# Patient Record
Sex: MALE | Race: WHITE | NOT HISPANIC OR LATINO | Employment: UNEMPLOYED | URBAN - NONMETROPOLITAN AREA
[De-identification: names, ages, dates, MRNs, and addresses within clinical notes are randomized per-mention and may not be internally consistent; named-entity substitution may affect disease eponyms.]

---

## 2024-10-14 ENCOUNTER — HOSPITAL ENCOUNTER (EMERGENCY)
Facility: HOSPITAL | Age: 44
Discharge: HOME | End: 2024-10-14
Payer: COMMERCIAL

## 2024-10-14 ENCOUNTER — TELEPHONE (OUTPATIENT)
Age: 44
End: 2024-10-14

## 2024-10-14 VITALS
DIASTOLIC BLOOD PRESSURE: 84 MMHG | OXYGEN SATURATION: 99 % | RESPIRATION RATE: 18 BRPM | TEMPERATURE: 97.8 F | SYSTOLIC BLOOD PRESSURE: 132 MMHG | HEART RATE: 90 BPM | WEIGHT: 145.72 LBS

## 2024-10-14 DIAGNOSIS — F41.9 ANXIETY: Primary | ICD-10-CM

## 2024-10-14 LAB
ATRIAL RATE: 92 BPM
P AXIS: 76 DEGREES
PR INTERVAL: 156 MS
QRS AXIS: 70 DEGREES
QRSD INTERVAL: 88 MS
QT INTERVAL: 360 MS
QTC INTERVAL: 445 MS
T WAVE AXIS: 64 DEGREES
VENTRICULAR RATE: 92 BPM

## 2024-10-14 PROCEDURE — 99285 EMERGENCY DEPT VISIT HI MDM: CPT

## 2024-10-14 PROCEDURE — 99284 EMERGENCY DEPT VISIT MOD MDM: CPT

## 2024-10-14 PROCEDURE — 93005 ELECTROCARDIOGRAM TRACING: CPT

## 2024-10-14 PROCEDURE — 93010 ELECTROCARDIOGRAM REPORT: CPT | Performed by: INTERNAL MEDICINE

## 2024-10-14 RX ORDER — LORAZEPAM 1 MG/1
1 TABLET ORAL ONCE
Status: COMPLETED | OUTPATIENT
Start: 2024-10-14 | End: 2024-10-14

## 2024-10-14 RX ORDER — LORAZEPAM 1 MG/1
1 TABLET ORAL 2 TIMES DAILY PRN
Qty: 10 TABLET | Refills: 0 | Status: SHIPPED | OUTPATIENT
Start: 2024-10-14 | End: 2024-10-24

## 2024-10-14 RX ADMIN — LORAZEPAM 1 MG: 1 TABLET ORAL at 03:13

## 2024-10-14 NOTE — TELEPHONE ENCOUNTER
cted the patient regarding a routine referral to verify service needs and inform pt of the current waitlist. Message left for patient to call back for assistance with being placed on the proper wait list.     Writer checked [facility] for pts insurance: [facility]

## 2024-10-14 NOTE — ED PROVIDER NOTES
Time reflects when diagnosis was documented in both MDM as applicable and the Disposition within this note       Time User Action Codes Description Comment    10/14/2024  3:34 AM Billy Glass Add [F41.9] Anxiety           ED Disposition       ED Disposition   Discharge    Condition   Stable    Date/Time   Mon Oct 14, 2024  3:34 AM    Comment   Nic Estrada discharge to home/self care.                   Assessment & Plan       Medical Decision Making  Medical complexity: 44-year-old male presenting with symptoms of anxiety and heart racing.  I suspect that his heart racing is likely secondary to his anxiety but will screen EKG for acute ischemic change or signs of arrhythmia or arrhythmia prone abnormality.  Will offer the patient pharmacologic therapy for his anxiety (likely benzodiazepine in the setting of opioid withdrawal), and also counseled the patient on the likelihood of ongoing symptoms in the setting of withdrawing from Suboxone.  On physical examination, no red flags, and essentially normal range vital signs except for very mild hypertension.  Will continue to monitor on cardiac monitoring while patient here in the ED.  I offered the patient crisis evaluation however he declining at this time as he does not feel he is a danger to himself or others.  Will refer to psychiatry, and likely discharge after trial dose of Ativan in the ED.    Reassessment/disposition: Thankfully, patient's symptoms were improving after dose of anxiolytic here in the emergency department.  His ECG was within normal limits.  Patient is having no further symptoms at this time.  Therefore will be discharged with psychiatry referral, PCP follow-up, and instructed to come back to the ED if he has any SI, HI, or severe depression.  Patient expressed understanding of these return precautions and agrees with the follow-up plan as above.    Risk  Prescription drug management.        ED Course as of 10/14/24 0421   Mon Oct 14, 2024    0313 ECG interpreted by me.  Date: 10/14/2024.  Time: 0255.  Rate: 92 bpm.  Axis: Normal.  Rhythm: Regular.  This is sinus rhythm with P waves preceding every QRS.  There are no ST elevations or depressions evident.  This is a normal ECG.       Medications   LORazepam (ATIVAN) tablet 1 mg (1 mg Oral Given 10/14/24 0313)       ED Risk Strat Scores                           SBIRT 20yo+      Flowsheet Row Most Recent Value   Initial Alcohol Screen: US AUDIT-C     1. How often do you have a drink containing alcohol? 0 Filed at: 10/14/2024 0253   2. How many drinks containing alcohol do you have on a typical day you are drinking?  0 Filed at: 10/14/2024 0253   3a. Male UNDER 65: How often do you have five or more drinks on one occasion? 0 Filed at: 10/14/2024 0253   3b. FEMALE Any Age, or MALE 65+: How often do you have 4 or more drinks on one occassion? 0 Filed at: 10/14/2024 0253   Audit-C Score 0 Filed at: 10/14/2024 0253   ROSALIE: How many times in the past year have you...    Used an illegal drug or used a prescription medication for non-medical reasons? Never Filed at: 10/14/2024 0253                            History of Present Illness       Chief Complaint   Patient presents with    Anxiety     Pt states that he has been having increased anxiety these last 2 days and has been having palpitations due to it.        Past Medical History:   Diagnosis Date    Anxiety     Cirrhosis (HCC)       History reviewed. No pertinent surgical history.   History reviewed. No pertinent family history.   Social History     Tobacco Use    Smoking status: Never    Smokeless tobacco: Never   Vaping Use    Vaping status: Every Day   Substance Use Topics    Alcohol use: Never    Drug use: Never      E-Cigarette/Vaping    E-Cigarette Use Current Every Day User     Cartridges/Day .5 a day       E-Cigarette/Vaping Substances      I have reviewed and agree with the history as documented.     This is a 44-year-old male who is recovering  "from opioid addiction (had been on Suboxone until last week and is trying to wean himself), who is presenting today with anxiety, and feelings of heart racing.  He states that he just moved from [place] a few days ago.  Since moving, he states that when he lies down at night and tries to go to sleep he begins to feel very anxious and feels his heart racing.  He notes a feeling of impending doom.  He states this is causing him significant distress.  He states that during the day as his symptoms are better, however his symptoms worsened again at night.  He is currently lives with his mother in [place] and is planning to stay here for the foreseeable future.  When he was in [place] he was on Suboxone but now wishes to be off of all medications if possible.  He does take \"Kratum\" to manage his withdrawal symptoms for the past several days.  I counseled the patient on the dangers of kratom and how it may be making his anxiety worse.  Patient does report that he has a history of anxiety in the past.  He denies ever needing psychiatric hospitalization for his anxiety.  He notes that he does have some baseline depression, but denies any SI, HI, AH or VH.  His mother corroborates his story and states that she is mostly concerned about his anxiety, and does not feel that he is a danger to himself or others.        Review of Systems   Constitutional:  Negative for chills, fatigue and fever.   HENT:  Negative for congestion and sore throat.    Eyes:  Negative for pain and visual disturbance.   Respiratory:  Negative for cough, chest tightness and shortness of breath.    Cardiovascular:  Positive for palpitations. Negative for chest pain.   Gastrointestinal:  Negative for abdominal pain, blood in stool, constipation, diarrhea, nausea and vomiting.   Genitourinary:  Negative for dysuria, flank pain and hematuria.   Musculoskeletal:  Negative for arthralgias, back pain and neck pain.   Skin:  Negative for color change " and rash.   Neurological:  Negative for dizziness, syncope and light-headedness.   Hematological:  Negative for adenopathy. Does not bruise/bleed easily.   Psychiatric/Behavioral:  Positive for agitation and dysphoric mood. The patient is nervous/anxious.    All other systems reviewed and are negative.          Objective       ED Triage Vitals [10/14/24 0254]   Temperature Pulse Blood Pressure Respirations SpO2 Patient Position - Orthostatic VS   97.8 °F (36.6 °C) 90 132/84 18 99 % Lying      Temp Source Heart Rate Source BP Location FiO2 (%) Pain Score    Temporal Monitor Left arm -- No Pain      Vitals      Date and Time Temp Pulse SpO2 Resp BP Pain Score FACES Pain Rating User   10/14/24 0254 97.8 °F (36.6 °C) 90 99 % 18 132/84 No Pain -- AB            Physical Exam  Vitals and nursing note reviewed.   Constitutional:       General: He is not in acute distress.     Appearance: He is well-developed and normal weight. He is not toxic-appearing or diaphoretic.   HENT:      Head: Normocephalic and atraumatic.      Right Ear: External ear normal.      Left Ear: External ear normal.      Nose: Nose normal. No congestion or rhinorrhea.      Mouth/Throat:      Mouth: Mucous membranes are moist.      Pharynx: No oropharyngeal exudate or posterior oropharyngeal erythema.   Eyes:      General: No scleral icterus.     Extraocular Movements: Extraocular movements intact.      Conjunctiva/sclera: Conjunctivae normal.      Pupils: Pupils are equal, round, and reactive to light.   Cardiovascular:      Rate and Rhythm: Normal rate and regular rhythm.      Pulses: Normal pulses.      Heart sounds: No murmur heard.  Pulmonary:      Effort: Pulmonary effort is normal. No respiratory distress.      Breath sounds: Normal breath sounds. No wheezing or rales.   Abdominal:      Palpations: Abdomen is soft. There is no mass.      Tenderness: There is no abdominal tenderness. There is no right CVA tenderness, left CVA tenderness or  guarding.      Hernia: No hernia is present.   Musculoskeletal:         General: No swelling. Normal range of motion.      Cervical back: Normal range of motion and neck supple.      Right lower leg: No edema.      Left lower leg: No edema.   Skin:     General: Skin is warm and dry.      Capillary Refill: Capillary refill takes less than 2 seconds.   Neurological:      General: No focal deficit present.      Mental Status: He is alert and oriented to person, place, and time.   Psychiatric:         Attention and Perception: He is attentive. He does not perceive visual hallucinations.         Mood and Affect: Mood is anxious.         Speech: Speech is not rapid and pressured or delayed.         Behavior: Behavior is hyperactive. Behavior is not agitated, slowed, aggressive, withdrawn or combative.         Thought Content: Thought content does not include homicidal or suicidal ideation.         Cognition and Memory: Cognition is not impaired. Memory is not impaired.         Judgment: Judgment normal.         Results Reviewed       None            No orders to display       Procedures    ED Medication and Procedure Management   None     Discharge Medication List as of 10/14/2024  3:38 AM        START taking these medications    Details   LORazepam (Ativan) 1 mg tablet Take 1 tablet (1 mg total) by mouth 2 (two) times a day as needed for anxiety for up to 10 days, Starting Mon 10/14/2024, Until Thu 10/24/2024 at 2359, Normal             ED SEPSIS DOCUMENTATION   Time reflects when diagnosis was documented in both MDM as applicable and the Disposition within this note       Time User Action Codes Description Comment    10/14/2024  3:34 AM Billy Glass Add [F41.9] Anxiety                  Billy Glass MD  10/14/24 0424

## 2024-10-14 NOTE — DISCHARGE INSTRUCTIONS
Your Ativan should only be taken as needed.  Try not to take unless you really needed to sleep at night.  It is habit-forming and can be addictive.  You need to set up an appointment with a primary doctor now that you have insurance, and psychiatrist.  You will find that not very many people will write prescription for anxiety meds in the Emergency Dept so if you need ongoing pharmacotherapy you will need to go through a primary doctor.    If you do not feel safe with yourself, if you are having thoughts of hurting yourself or others, or if you are having chest pain or shortness of breath come back to the emergency department.

## 2024-10-15 ENCOUNTER — TELEPHONE (OUTPATIENT)
Age: 44
End: 2024-10-15

## 2024-11-04 ENCOUNTER — OFFICE VISIT (OUTPATIENT)
Dept: FAMILY MEDICINE CLINIC | Facility: CLINIC | Age: 44
End: 2024-11-04
Payer: COMMERCIAL

## 2024-11-04 VITALS
HEIGHT: 68 IN | BODY MASS INDEX: 21.92 KG/M2 | SYSTOLIC BLOOD PRESSURE: 122 MMHG | DIASTOLIC BLOOD PRESSURE: 82 MMHG | OXYGEN SATURATION: 99 % | WEIGHT: 144.6 LBS | HEART RATE: 66 BPM | TEMPERATURE: 98.4 F

## 2024-11-04 DIAGNOSIS — F10.11 HISTORY OF ALCOHOL ABUSE: ICD-10-CM

## 2024-11-04 DIAGNOSIS — Z87.19 HISTORY OF PANCREATITIS: ICD-10-CM

## 2024-11-04 DIAGNOSIS — Z87.19 HISTORY OF CIRRHOSIS OF LIVER: ICD-10-CM

## 2024-11-04 DIAGNOSIS — Z86.19 HISTORY OF HEPATITIS C: ICD-10-CM

## 2024-11-04 DIAGNOSIS — F11.90 METHADONE USE: ICD-10-CM

## 2024-11-04 DIAGNOSIS — F11.11 HISTORY OF OPIOID ABUSE (HCC): ICD-10-CM

## 2024-11-04 DIAGNOSIS — F41.9 ANXIETY: Primary | ICD-10-CM

## 2024-11-04 DIAGNOSIS — Z72.0 TOBACCO USE: ICD-10-CM

## 2024-11-04 DIAGNOSIS — R03.0 ELEVATED BLOOD PRESSURE READING: ICD-10-CM

## 2024-11-04 PROCEDURE — 99203 OFFICE O/P NEW LOW 30 MIN: CPT | Performed by: FAMILY MEDICINE

## 2024-11-04 RX ORDER — METHADONE HYDROCHLORIDE 10 MG/5ML
35 SOLUTION ORAL DAILY
COMMUNITY

## 2024-11-04 RX ORDER — BUSPIRONE HYDROCHLORIDE 7.5 MG/1
7.5 TABLET ORAL 2 TIMES DAILY
Qty: 60 TABLET | Refills: 2 | Status: SHIPPED | OUTPATIENT
Start: 2024-11-04

## 2024-11-04 NOTE — PROGRESS NOTES
Ambulatory Visit  Name: Nic Estrada      : 1980      MRN: [id number]  Encounter Provider: Benton Tate DO  Encounter Date: 2024   Encounter department: [facility]    Assessment & Plan  Anxiety  Patient was seen in the emergency room and given lorazepam x 1 dose.  I did discuss with the patient that lorazepam is not indicated.  Patient has had a good response to BuSpar in the past.  Patient has been unable to tolerate SNRIs or SSRIs.  Orders:    Ambulatory Referral to Family Practice    busPIRone (BUSPAR) 7.5 mg tablet; Take 1 tablet (7.5 mg total) by mouth 2 (two) times a day    History of hepatitis C  Patient states he has had a history of hep C since the age of 27.  It is never been treated.  The patient states that when he was hospitalized with acute pancreatitis in the past they told him that he also has cirrhosis.  Will obtain lab work and a baseline ultrasound.  Will also refer to gastroenterology.  Orders:    US right upper quadrant with liver dopplers; Future    CBC and differential; Future    HIV 1/2 AB/AG w Reflex [facility] for 2 yr old and above; Future    Hepatitis panel, acute; Future    Hepatitis C RNA, Quantitative PCR; Future    Protime-INR; Future    Ambulatory Referral to Gastroenterology; Future    History of cirrhosis of liver  Obtain lab work and a baseline ultrasound of the liver.  Refer to gastroenterology.  Orders:    US right upper quadrant with liver dopplers; Future    HIV 1/2 AB/AG w Reflex [facility] for 2 yr old and above; Future    Hepatitis panel, acute; Future    Hepatitis C RNA, Quantitative PCR; Future    Protime-INR; Future    Ambulatory Referral to Gastroenterology; Future    Tobacco use  Discussed tobacco cessation.  Patient is not ready at this time.  Readdress at follow-up visits.       History of pancreatitis  No recurrence of pancreatitis.  Patient states that he was hospitalized for acute pancreatitis when he was younger and was abusing alcohol.        History of opioid abuse (HCC)  Patient has a past history of IV drug abuse when he was younger.  Recently he came from [place] about 2 months ago.  He was abusing Suboxone.  He has recently been seeing a methadone clinic with Dr. Dill in [place].  Orders:    CBC and differential; Future    TSH, 3rd generation with Free T4 reflex; Future    Elevated blood pressure reading  Repeat blood pressure satisfactory.  Continue to monitor.  Orders:    CBC and differential; Future    Comprehensive metabolic panel; Future    TSH, 3rd generation with Free T4 reflex; Future    Methadone use  Patient sees methadone clinic daily in [place].  The physician is Dr. Delphine Dill       History of alcohol abuse  Patient states that he has not used alcohol for a long period of time.  This was mostly when he was younger and was binge drinking.  This resulted in pancreatitis.  Orders:    US right upper quadrant with liver dopplers; Future    Ambulatory Referral to Gastroenterology; Future       History of Present Illness     Patient is a pleasant 44-year-old male who presents this afternoon to establish care at [facility].  The patient recently moved from [place] about 2 months ago to this area.  The patient tells me he grew up in this area as a child.  The patient relates a history of hepatitis C that was diagnosed at the age of 27 after sharing needles while abusing drugs.  Patient did not seek treatment.  Patient also states that when he was younger he abused alcohol and was admitted for acute pancreatitis.  The patient was recently abusing Suboxone but entered a methadone clinic in [place].  The physician there is Dr. Dill.    Anxiety  Symptoms include nervous/anxious behavior. Patient reports no chest pain, palpitations or shortness of breath.             Review of Systems   Constitutional:  Negative for chills and fever.   HENT:  Negative for ear pain and sore throat.    Eyes:  Negative for pain and  "visual disturbance.   Respiratory:  Negative for cough and shortness of breath.    Cardiovascular:  Negative for chest pain and palpitations.   Gastrointestinal:  Negative for abdominal pain and vomiting.   Genitourinary:  Negative for dysuria and hematuria.   Musculoskeletal:  Negative for arthralgias and back pain.   Skin:  Negative for color change and rash.   Neurological:  Negative for seizures and syncope.   Psychiatric/Behavioral:  The patient is nervous/anxious.    All other systems reviewed and are negative.          Objective     /92   Pulse 66   Temp 98.4 °F (36.9 °C)   Ht 5' 7.75\" (1.721 m)   Wt 65.6 kg (144 lb 9.6 oz)   SpO2 99%   BMI 22.15 kg/m²     Physical Exam  Vitals and nursing note reviewed.   Constitutional:       General: He is not in acute distress.     Appearance: Normal appearance. He is well-developed.   HENT:      Head: Normocephalic and atraumatic.      Right Ear: Tympanic membrane normal.      Left Ear: Tympanic membrane normal.      Mouth/Throat:      Mouth: Mucous membranes are moist.      Pharynx: Oropharynx is clear.   Eyes:      Extraocular Movements: Extraocular movements intact.      Conjunctiva/sclera: Conjunctivae normal.      Pupils: Pupils are equal, round, and reactive to light.   Cardiovascular:      Rate and Rhythm: Normal rate and regular rhythm.      Heart sounds: Normal heart sounds. No murmur heard.     No friction rub.   Pulmonary:      Effort: Pulmonary effort is normal. No respiratory distress.      Breath sounds: Normal breath sounds.   Abdominal:      General: Bowel sounds are normal.      Palpations: Abdomen is soft.      Tenderness: There is no abdominal tenderness. There is no guarding or rebound.      Hernia: No hernia is present.   Musculoskeletal:         General: No swelling.      Cervical back: Neck supple.   Skin:     General: Skin is warm and dry.      Capillary Refill: Capillary refill takes less than 2 seconds.   Neurological:      General: " No focal deficit present.      Mental Status: He is alert and oriented to person, place, and time.      Gait: Gait normal.   Psychiatric:         Mood and Affect: Mood normal.         Behavior: Behavior normal.         Thought Content: Thought content normal.       Administrative Statements   I have spent a total time of 30 minutes in caring for this patient on the day of the visit/encounter including Counseling / Coordination of care, Documenting in the medical record, Reviewing / ordering tests, medicine, procedures  , and Obtaining or reviewing history  .

## 2024-11-04 NOTE — ASSESSMENT & PLAN NOTE
Discussed tobacco cessation.  Patient is not ready at this time.  Readdress at follow-up visits.

## 2024-11-04 NOTE — ASSESSMENT & PLAN NOTE
Obtain lab work and a baseline ultrasound of the liver.  Refer to gastroenterology.  Orders:    US right upper quadrant with liver dopplers; Future    HIV 1/2 AB/AG w Reflex [facility] for 2 yr old and above; Future    Hepatitis panel, acute; Future    Hepatitis C RNA, Quantitative PCR; Future    Protime-INR; Future    Ambulatory Referral to Gastroenterology; Future

## 2024-11-04 NOTE — ASSESSMENT & PLAN NOTE
Repeat blood pressure satisfactory.  Continue to monitor.  Orders:    CBC and differential; Future    Comprehensive metabolic panel; Future    TSH, 3rd generation with Free T4 reflex; Future

## 2024-11-04 NOTE — ASSESSMENT & PLAN NOTE
No recurrence of pancreatitis.  Patient states that he was hospitalized for acute pancreatitis when he was younger and was abusing alcohol.

## 2024-11-04 NOTE — ASSESSMENT & PLAN NOTE
Patient states he has had a history of hep C since the age of 27.  It is never been treated.  The patient states that when he was hospitalized with acute pancreatitis in the past they told him that he also has cirrhosis.  Will obtain lab work and a baseline ultrasound.  Will also refer to gastroenterology.  Orders:    US right upper quadrant with liver dopplers; Future    CBC and differential; Future    HIV 1/2 AB/AG w Reflex [facility] for 2 yr old and above; Future    Hepatitis panel, acute; Future    Hepatitis C RNA, Quantitative PCR; Future    Protime-INR; Future    Ambulatory Referral to Gastroenterology; Future

## 2024-11-04 NOTE — ASSESSMENT & PLAN NOTE
Patient was seen in the emergency room and given lorazepam x 1 dose.  I did discuss with the patient that lorazepam is not indicated.  Patient has had a good response to BuSpar in the past.  Patient has been unable to tolerate SNRIs or SSRIs.  Orders:    Ambulatory Referral to Family Practice    busPIRone (BUSPAR) 7.5 mg tablet; Take 1 tablet (7.5 mg total) by mouth 2 (two) times a day

## 2025-01-07 ENCOUNTER — TELEPHONE (OUTPATIENT)
Age: 45
End: 2025-01-07

## 2025-01-07 NOTE — TELEPHONE ENCOUNTER
Contacted patient off of Medication Management  to verify needs of services in attempts to offer patient an appointment. spoke with patient whom stated they are no longer interested In services.

## 2025-01-12 ENCOUNTER — APPOINTMENT (OUTPATIENT)
Dept: LAB | Facility: CLINIC | Age: 45
End: 2025-01-12
Payer: COMMERCIAL

## 2025-01-12 DIAGNOSIS — Z86.19 HISTORY OF HEPATITIS C: ICD-10-CM

## 2025-01-12 DIAGNOSIS — F11.11 HISTORY OF OPIOID ABUSE (HCC): ICD-10-CM

## 2025-01-12 DIAGNOSIS — R03.0 ELEVATED BLOOD PRESSURE READING: ICD-10-CM

## 2025-01-12 DIAGNOSIS — Z87.19 HISTORY OF CIRRHOSIS OF LIVER: ICD-10-CM

## 2025-01-12 LAB
ALBUMIN SERPL BCG-MCNC: 3.9 G/DL (ref 3.5–5)
ALP SERPL-CCNC: 47 U/L (ref 34–104)
ALT SERPL W P-5'-P-CCNC: 37 U/L (ref 7–52)
ANION GAP SERPL CALCULATED.3IONS-SCNC: 5 MMOL/L (ref 4–13)
AST SERPL W P-5'-P-CCNC: 29 U/L (ref 13–39)
BASOPHILS # BLD AUTO: 0.06 THOUSANDS/ΜL (ref 0–0.1)
BASOPHILS NFR BLD AUTO: 1 % (ref 0–1)
BILIRUB SERPL-MCNC: 0.42 MG/DL (ref 0.2–1)
BUN SERPL-MCNC: 23 MG/DL (ref 5–25)
CALCIUM SERPL-MCNC: 9 MG/DL (ref 8.4–10.2)
CHLORIDE SERPL-SCNC: 103 MMOL/L (ref 96–108)
CO2 SERPL-SCNC: 26 MMOL/L (ref 21–32)
CREAT SERPL-MCNC: 1.13 MG/DL (ref 0.6–1.3)
EOSINOPHIL # BLD AUTO: 0.53 THOUSAND/ΜL (ref 0–0.61)
EOSINOPHIL NFR BLD AUTO: 7 % (ref 0–6)
ERYTHROCYTE [DISTWIDTH] IN BLOOD BY AUTOMATED COUNT: 11.9 % (ref 11.6–15.1)
GFR SERPL CREATININE-BSD FRML MDRD: 79 ML/MIN/1.73SQ M
GLUCOSE P FAST SERPL-MCNC: 144 MG/DL (ref 65–99)
HCT VFR BLD AUTO: 42.3 % (ref 36.5–49.3)
HGB BLD-MCNC: 14.6 G/DL (ref 12–17)
IMM GRANULOCYTES # BLD AUTO: 0.02 THOUSAND/UL (ref 0–0.2)
IMM GRANULOCYTES NFR BLD AUTO: 0 % (ref 0–2)
INR PPP: 0.94 (ref 0.85–1.19)
LYMPHOCYTES # BLD AUTO: 2.03 THOUSANDS/ΜL (ref 0.6–4.47)
LYMPHOCYTES NFR BLD AUTO: 26 % (ref 14–44)
MCH RBC QN AUTO: 31.5 PG (ref 26.8–34.3)
MCHC RBC AUTO-ENTMCNC: 34.5 G/DL (ref 31.4–37.4)
MCV RBC AUTO: 91 FL (ref 82–98)
MONOCYTES # BLD AUTO: 0.52 THOUSAND/ΜL (ref 0.17–1.22)
MONOCYTES NFR BLD AUTO: 7 % (ref 4–12)
NEUTROPHILS # BLD AUTO: 4.8 THOUSANDS/ΜL (ref 1.85–7.62)
NEUTS SEG NFR BLD AUTO: 59 % (ref 43–75)
NRBC BLD AUTO-RTO: 0 /100 WBCS
PLATELET # BLD AUTO: 280 THOUSANDS/UL (ref 149–390)
PMV BLD AUTO: 10 FL (ref 8.9–12.7)
POTASSIUM SERPL-SCNC: 4.2 MMOL/L (ref 3.5–5.3)
PROT SERPL-MCNC: 6.8 G/DL (ref 6.4–8.4)
PROTHROMBIN TIME: 13.3 SECONDS (ref 12.3–15)
RBC # BLD AUTO: 4.63 MILLION/UL (ref 3.88–5.62)
SODIUM SERPL-SCNC: 134 MMOL/L (ref 135–147)
TSH SERPL DL<=0.05 MIU/L-ACNC: 1.05 UIU/ML (ref 0.45–4.5)
WBC # BLD AUTO: 7.96 THOUSAND/UL (ref 4.31–10.16)

## 2025-01-12 PROCEDURE — 85610 PROTHROMBIN TIME: CPT

## 2025-01-12 PROCEDURE — 87389 HIV-1 AG W/HIV-1&-2 AB AG IA: CPT

## 2025-01-12 PROCEDURE — 80053 COMPREHEN METABOLIC PANEL: CPT

## 2025-01-12 PROCEDURE — 84443 ASSAY THYROID STIM HORMONE: CPT

## 2025-01-12 PROCEDURE — 87521 HEPATITIS C PROBE&RVRS TRNSC: CPT

## 2025-01-12 PROCEDURE — 87522 HEPATITIS C REVRS TRNSCRPJ: CPT

## 2025-01-12 PROCEDURE — 80074 ACUTE HEPATITIS PANEL: CPT

## 2025-01-12 PROCEDURE — 85025 COMPLETE CBC W/AUTO DIFF WBC: CPT

## 2025-01-12 PROCEDURE — 36415 COLL VENOUS BLD VENIPUNCTURE: CPT

## 2025-01-13 LAB
HAV IGM SER QL: ABNORMAL
HBV CORE IGM SER QL: ABNORMAL
HBV SURFACE AG SER QL: ABNORMAL
HCV AB SER QL: REACTIVE
HCV RNA SERPL NAA+PROBE-ACNC: ABNORMAL IU/ML
HCV RNA SERPL NAA+PROBE-ACNC: DETECTED K[IU]/ML
HIV 1+2 AB+HIV1 P24 AG SERPL QL IA: NORMAL
HIV 2 AB SERPL QL IA: NORMAL
HIV1 AB SERPL QL IA: NORMAL
HIV1 P24 AG SERPL QL IA: NORMAL

## 2025-01-15 ENCOUNTER — RESULTS FOLLOW-UP (OUTPATIENT)
Dept: FAMILY MEDICINE CLINIC | Facility: CLINIC | Age: 45
End: 2025-01-15

## 2025-01-20 ENCOUNTER — OFFICE VISIT (OUTPATIENT)
Dept: FAMILY MEDICINE CLINIC | Facility: CLINIC | Age: 45
End: 2025-01-20
Payer: COMMERCIAL

## 2025-01-20 ENCOUNTER — TELEPHONE (OUTPATIENT)
Dept: FAMILY MEDICINE CLINIC | Facility: CLINIC | Age: 45
End: 2025-01-20

## 2025-01-20 ENCOUNTER — TELEPHONE (OUTPATIENT)
Age: 45
End: 2025-01-20

## 2025-01-20 DIAGNOSIS — Z13.6 ENCOUNTER FOR LIPID SCREENING FOR CARDIOVASCULAR DISEASE: ICD-10-CM

## 2025-01-20 DIAGNOSIS — F41.9 ANXIETY: ICD-10-CM

## 2025-01-20 DIAGNOSIS — Z86.19 HISTORY OF HEPATITIS C: ICD-10-CM

## 2025-01-20 DIAGNOSIS — F11.11 HISTORY OF OPIOID ABUSE (HCC): ICD-10-CM

## 2025-01-20 DIAGNOSIS — Z13.220 ENCOUNTER FOR LIPID SCREENING FOR CARDIOVASCULAR DISEASE: ICD-10-CM

## 2025-01-20 DIAGNOSIS — E11.65 TYPE 2 DIABETES MELLITUS WITH HYPERGLYCEMIA, WITHOUT LONG-TERM CURRENT USE OF INSULIN (HCC): ICD-10-CM

## 2025-01-20 DIAGNOSIS — R35.1 NOCTURIA: Primary | ICD-10-CM

## 2025-01-20 PROCEDURE — 99213 OFFICE O/P EST LOW 20 MIN: CPT | Performed by: FAMILY MEDICINE

## 2025-01-20 RX ORDER — BUSPIRONE HYDROCHLORIDE 15 MG/1
15 TABLET ORAL 2 TIMES DAILY
Qty: 60 TABLET | Refills: 4 | Status: SHIPPED | OUTPATIENT
Start: 2025-01-20

## 2025-01-20 RX ORDER — TAMSULOSIN HYDROCHLORIDE 0.4 MG/1
0.4 CAPSULE ORAL
Qty: 30 CAPSULE | Refills: 2 | Status: SHIPPED | OUTPATIENT
Start: 2025-01-20

## 2025-01-20 NOTE — TELEPHONE ENCOUNTER
Pt called in returning call about changing appt to [facility].     Pt inquired what was that? Appears pt does not have [facility] appt. Pt inquires could Dr. Tate please call his phone instead.     Attempted to reach out to Clerical team, but no one was available at the moment.     Please advise & call pt back with update on appt. Thank you!    Nic: [phone]

## 2025-01-20 NOTE — ASSESSMENT & PLAN NOTE
Reinforced being compliant with gastroenterology consult as well as getting an ultrasound of the right upper quadrant due to hepatitis C.  Orders:    US right upper quadrant with liver dopplers; Future    Ambulatory Referral to Gastroenterology; Future

## 2025-01-20 NOTE — PROGRESS NOTES
"Name: Nic Estrada      : 1980      MRN: [id number]  Encounter Provider: Benton Tate DO  Encounter Date: 2025   Encounter department: [facility]  :  Assessment & Plan  Nocturia  Will obtain a PSA, urinalysis with reflex culture, hemoglobin A1c, referral to urology, fasting CMP, hemoglobin A1c and urinalysis.  Patient will also have a PSA done.  Patient states there is a family history of diabetes.  Orders:    PSA, Total Screen; Future    UA w Reflex to Microscopic w Reflex to Culture; Future    Hemoglobin A1C; Future    tamsulosin (FLOMAX) 0.4 mg; Take 1 capsule (0.4 mg total) by mouth daily with dinner    Ambulatory Referral to Urology; Future    Comprehensive metabolic panel; Future    Hemoglobin A1C; Future    UA w Reflex to Microscopic w Reflex to Culture; Future    Type 2 diabetes mellitus with hyperglycemia, without long-term current use of insulin (HCC)  Unsure if patient has a diagnosis of diabetes.  Blood sugar was 144.  Check hemoglobin A1c.  No results found for: \"HGBA1C\"    Orders:    Hemoglobin A1C; Future  Patient will get a hemoglobin A1c because his blood sugar was 144  History of hepatitis C  Reinforced being compliant with gastroenterology consult as well as getting an ultrasound of the right upper quadrant due to hepatitis C.  Orders:    US right upper quadrant with liver dopplers; Future    Ambulatory Referral to Gastroenterology; Future    History of opioid abuse (HCC)  Currently on methadone by Dr. Dill.  Patient states he is doing well and is in remission       Anxiety  Will increase buspirone to 15 mg by mouth 2 times a day.  Advised patient to take the second dose about 30 minutes prior to bedtime.  Hopefully this will help with his sleep.  Orders:    busPIRone (BUSPAR) 15 mg tablet; Take 1 tablet (15 mg total) by mouth 2 (two) times a day    Encounter for lipid screening for cardiovascular disease    Orders:    Lipid Panel with Direct LDL reflex; " Future           History of Present Illness   The patient is a pleasant 44-year-old male who was seen in telephone consultation today for review of lab work.  Reviewed patient's lab work which revealed a positive hepatitis C viral load.  HIV was negative.  Complete blood count was significant for an elevated blood sugar of 144.  LFTs were normal.  CBC was normal.    Patient with complaints of nocturia and anxiety over the last 3 months.  Patient will require a urine analysis CNS, fasting CMP, PSA and lipid panel.  He also needs a referral placed again for gastroenterology as well as ultrasound of right upper quadrant due to history of hepatitis.  The patient is currently on methadone by Dr. Dill.  He states he is doing well and is in remission.      Review of Systems   Constitutional:  Negative for chills and fever.   HENT:  Negative for ear pain and sore throat.    Eyes:  Negative for pain and visual disturbance.   Respiratory:  Negative for cough and shortness of breath.    Cardiovascular:  Negative for chest pain and palpitations.   Gastrointestinal:  Negative for abdominal pain and vomiting.   Genitourinary:  Negative for dysuria and hematuria.        Nocturia x 3 months   Musculoskeletal:  Negative for arthralgias and back pain.   Skin:  Negative for color change and rash.   Neurological:  Negative for seizures and syncope.   Psychiatric/Behavioral:  The patient is nervous/anxious.    All other systems reviewed and are negative.      Objective   There were no vitals taken for this visit.     Physical Exam  Vitals reviewed.   Constitutional:       Appearance: Normal appearance.   Neurological:      Mental Status: He is alert and oriented to person, place, and time.   Psychiatric:         Mood and Affect: Mood normal.         Behavior: Behavior normal.         Thought Content: Thought content normal.

## 2025-01-20 NOTE — ASSESSMENT & PLAN NOTE
Will increase buspirone to 15 mg by mouth 2 times a day.  Advised patient to take the second dose about 30 minutes prior to bedtime.  Hopefully this will help with his sleep.  Orders:    busPIRone (BUSPAR) 15 mg tablet; Take 1 tablet (15 mg total) by mouth 2 (two) times a day

## 2025-01-20 NOTE — PATIENT INSTRUCTIONS
Follow-up 6 weeks to recheck right upper quadrant ultrasound, gastroenterology consult, urology consult, fasting lab work and right upper quadrant ultrasound.  As well as check fasting lab work and symptoms.

## 2025-01-22 NOTE — TELEPHONE ENCOUNTER
Explained to patient we do not do virtual appt unless they have a physical health reason that they can not come into the office. Patient had virtual on snow day thought he could do it that way again. Told him the doctor did not make it in but we did not want to have to reschedule so many appt so we are able to do them in that case.

## 2025-01-27 ENCOUNTER — HOSPITAL ENCOUNTER (OUTPATIENT)
Dept: ULTRASOUND IMAGING | Facility: HOSPITAL | Age: 45
Discharge: HOME | End: 2025-01-27
Attending: FAMILY MEDICINE
Payer: COMMERCIAL

## 2025-01-27 DIAGNOSIS — Z86.19 HISTORY OF HEPATITIS C: ICD-10-CM

## 2025-01-27 PROCEDURE — 76705 ECHO EXAM OF ABDOMEN: CPT

## 2025-01-31 ENCOUNTER — RESULTS FOLLOW-UP (OUTPATIENT)
Dept: FAMILY MEDICINE CLINIC | Facility: CLINIC | Age: 45
End: 2025-01-31

## 2025-02-05 ENCOUNTER — OFFICE VISIT (OUTPATIENT)
Dept: GASTROENTEROLOGY | Facility: CLINIC | Age: 45
End: 2025-02-05
Payer: COMMERCIAL

## 2025-02-05 VITALS
OXYGEN SATURATION: 99 % | WEIGHT: 158 LBS | HEART RATE: 80 BPM | DIASTOLIC BLOOD PRESSURE: 82 MMHG | BODY MASS INDEX: 23.95 KG/M2 | TEMPERATURE: 97.6 F | HEIGHT: 68 IN | SYSTOLIC BLOOD PRESSURE: 124 MMHG

## 2025-02-05 DIAGNOSIS — F11.90 METHADONE USE: ICD-10-CM

## 2025-02-05 DIAGNOSIS — K59.03 DRUG-INDUCED CONSTIPATION: ICD-10-CM

## 2025-02-05 DIAGNOSIS — F11.11 HISTORY OF OPIOID ABUSE (HCC): ICD-10-CM

## 2025-02-05 DIAGNOSIS — Z87.19 HISTORY OF CIRRHOSIS OF LIVER: Primary | ICD-10-CM

## 2025-02-05 DIAGNOSIS — Z86.19 HISTORY OF HEPATITIS C: ICD-10-CM

## 2025-02-05 PROCEDURE — 99204 OFFICE O/P NEW MOD 45 MIN: CPT | Performed by: NURSE PRACTITIONER

## 2025-02-05 RX ORDER — POLYETHYLENE GLYCOL 3350 17 G/17G
17 POWDER, FOR SOLUTION ORAL DAILY
Qty: 510 G | Refills: 3 | Status: SHIPPED | OUTPATIENT
Start: 2025-02-05

## 2025-02-05 NOTE — ASSESSMENT & PLAN NOTE
Initial / Assessment/Plan of Care Note     Baseline Assessment  82year old admitted 10/9/2019 as Observation with a diagnosis of acute kidney injury on CKD stage III, LE wounds, right toe cellulitis. Prior to admission patient was living with Spouse/significant other, Family members(grand daughter) and residing at [facility]. Patient does not  have a Power of  for [employer] on file. Patient representative is Belle Davalos (spouse) [phone] (home).  Patientâs Primary Care Provider is Cash Frost MD.     Medical History  Past Medical History:   Diagnosis Date   â¢ Bundle branch block    â¢ Carotid stenosis     bilateral   â¢ CHB (complete heart block) (CMS/[facility])    â¢ Cholelithiasis     asymptomatic   â¢ Colon polyps    â¢ Congestive cardiac failure (CMS/[facility])    â¢ Coronary artery disease    â¢ Diabetes     type 2   â¢ Diverticulosis     of the colon   â¢ Dizziness    â¢ Epistaxis     cauterization & packing 4/2011   â¢ Fracture    â¢ GERD (gastroesophageal reflux disease)     taking Omeprazole   â¢ Hyperlipidemia    â¢ Hypertension    â¢ LAE (left atrial enlargement) 2012    moderate   â¢ LVH (left ventricular hypertrophy) 2012    moderate   â¢ Pacemaker    â¢ Permanent atrial fibrillation     s/p pacemaker   â¢ Personal history of traumatic fracture     collarbone   â¢ Psoriasis    â¢ Pulmonary hypertension (CMS/[facility]) 2012    moderate   â¢ Right atrial dilatation 2012    severe   â¢ Syncope     secondary to intermittent complete AV heart block   â¢ Urinary incontinence        Prior to Admission Status       Agency/Support  Type of Services Prior to Hospitalization: None  Support Systems: Spouse/Significant other, Family members  Home Devices/Equipment: Commode  Mobility Assist Devices: Standard walker  Sensory Support Devices: Dentures, Eyeglasses    Current Status  PT Ambulation Tips:    PT Transfer Tips:    OT Bathing Tips:    OT Dressing Tips:    OT Toileting Tips:    OT Feeding Tips:    SLP Swallow/Feeding Tips: Orders:  •  polyethylene glycol (GLYCOLAX) 17 GM/SCOOP powder; Take 17 g by mouth daily   SLP Comm/Cog Tips:    Current Mental Status:    Stressors:      Insurance  Primary: MEDICARE  Secondary: [employer]    Barriers to Discharge  Identified Barriers to Discharge/Transition Planning: Assessment/stabilization in progress, Consultation clearance for discharge (specify below), PT/OT clearance    Progress Note  Chart reviewed. Patient presented to ED with c/o generalized weakness, multiple skin ulcers on BLE, chronic SOB and LE edema. Cardiology, wound care, dietician consults pending. PT/OT evals ordered, await recommendations. Started on IV Ancef q 12 hours, 0.9 NS at 75 ml/hr. BUN 61, creatinine 2.32, following IV hydration BUN 57, creatinine 2. 11. Today ABT changed to PO Keflex, continue with IV hydration and recheck labs in AM.  Per chart review, patient resides at home with spouse Taz Hill and granddaughter. Up with walker at baseline. No prior services noted.  referral made - patient's son recently passed away and daughter has cancer. Patient with continued poor appetite despite outpatient treatment with remeron, trouble sleeping. CM will continue to monitor and assist with D/C planning needs. Addendum 1335:  CM met with patient, spouse and grandchildren today. Discussed PT/OT recommendations. Patient admitted Observation status and does not meet Medicare criteria to access Medicare benefit for subacute rehab placement. Medicare limitation explained to patient, spouse and grandchildren. Patient and family verbalizing understanding of Medicare payer limitations due to Observation status admission. Patientt may have access to benefits under the [employer] Benefits requirement, which would need to be verified by the accepting rehab facility. If patient does not have benefits under the supplemental policy, patient would have to pay privately for rehab admission.  Even if patient does have benefit with the supplemental policy, accepting facility may require payment up front with reimbursement to patient once payment received from supplemental insurance provider. Provided referral choice list for EV, patient and family chooses referral to [facility] - will review list to give alternative facilities. CM also provided with [employer], briefly discussed home care support, private hire caregivers. Caro Merlin SW to facilitate referral to [facility]. CM/SW will continue to monitor. Plan  SW/CM - Recommendations for Discharge: Await PT/OT evals and recommendations    Anticipate patient will need post-hospital services. Necessary services are available. Anticipate patient may return to the environment from which patient entered the hospital.   Anticipate patient may provide self-care at discharge. Refer to SW/CM Flowsheet for Goals and objective data.

## 2025-02-05 NOTE — PROGRESS NOTES
Name: Nic Estrada      : 1980      MRN: [id number]  Encounter Provider: ZOYA Dhillon  Encounter Date: 2025   Encounter department: [facility] [place]  :  Assessment & Plan  History of cirrhosis of liver  While the patient was in the office today, I discussed with the patient that since they are positive for hepatitis C, we will proceed with further imaging and blood work to fully evaluate the overall health of the liver and to help establish whether or not they would qualify to proceed with hepatitis C treatment.  I advised the patient that once we have the results of the diagnostic studies and blood work, our office will contact them to review the results and discuss any other treatment plan recommendations.  The patient was agreeable and verbalized an understanding.    I also explained to the patient that we have an entire team dedicated to the hepatitis C treatment program and that I would be forwarding their information to that team today and that in the next few days someone should be in contact with them as to the next step and how the overall process works.  I explained that the team will take over from this point with regards to any other workup that is necessary as well as qualifying them as a candidate for hepatitis C treatment and any prior authorization/pharmacy needs moving forward.  The patient was agreeable and verbalized an understanding.     Encouraged the patient to continue to abstain from any illegal drugs or alcohol.  Orders:  •  Hepatic function panel; Future  •  Hepatitis A antibody, total; Future  •  Hepatitis B surface antibody; Future  •  US elastography/UGAP; Future  •  HCV FIBROSURE; Future  •  Hepatitis C antibody; Future  •  Hepatitis C genotype; Future    History of hepatitis C  Orders:  •  Hepatic function panel; Future  •  Hepatitis A antibody, total; Future  •  Hepatitis B surface antibody; Future  •  US elastography/UGAP;  Future  •  HCV FIBROSURE; Future  •  Hepatitis C antibody; Future  •  Hepatitis C genotype; Future    History of opioid abuse (HCC)  See above.       Drug-induced constipation  While the patient and his mother in the office today, I discussed with the patient that with the chronic use of methadone to manage his opioid addiction it is very common to have drug-induced constipation and that for now one of the biggest things he can do is to work on drinking more water daily and I have encouraged him to try to drink at least 32 to 64 ounces of water daily.  I also would like him to start 1 capful of MiraLAX daily and can adjust up or down according to his bowel movements.  He is to contact our office if he has any side effects or issues with the changes in his medication regimen or he is still having issues with constipation.  The patient was agreeable and verbalized an understanding.    Continue with his methadone program as directed.  Orders:  •  polyethylene glycol (GLYCOLAX) 17 GM/SCOOP powder; Take 17 g by mouth daily    Methadone use  Orders:  •  polyethylene glycol (GLYCOLAX) 17 GM/SCOOP powder; Take 17 g by mouth daily    The patient is to schedule a follow up office visit in 3 months, but understands to call or contact our offices with any issues before then or as needed.     History of Present Illness   HPI  Nic Estrada is a 44 y.o. male who presents today for his initial consultation and evaluation for his chronic history of hepatitis C secondary to previous heroin drug abuse,, history of cirrhosis of the liver, current methadone use, history of opioid abuse, and drug-induced constipation.  The patient reports that he contracted hepatitis C through use of IV heroin at the age of 27 and because he had been off and on drugs since then, he has never sought treatment.  However, the patient has been sober for the last 2 years and is trying to address all of his health issues and concerns [place]    The patient also has a chronic and longstanding history of constipation, especially since starting methadone as part of his addiction therapy.  He reports that he deals with chronic constipation and straining to have bowel movements.    The patient currently denies any reflux, nausea, dysphagia, vomiting, decreased appetite, unplanned weight loss, or abdominal pain. Water Intake: 1 glass daily.     The patient currently reports that they have a BM every 2-3 days and reports that it is sometimes relieving, without any consistent diarrhea, nocturnal BMs, melena or bloody stools. [place]: 2. Last BM: This morning. Flatus: Yes, as per his normal.    The patient currently denies any evidence of jaundice, fevers, sravanthi colored stools, swelling of the lower extremities, fatigue, confusion, memory loss or easy bruising.    PMH/PSH:   Abdominal/Chest Surgery: Denied  Colon Cancer: Denied  Any Cancer: Denied  Pre-Cancerous Polyps: N/A  Crohn's: N/A  Ulcerative Colitis: N/A  Lamar's Esophagus: N/A  Celiac Disease: N/A  Liver Disease: Cirrhotic     Tobacco/Vaping: Nicotine vaping, 1 cartridge per month and Ciagrettes 7-8 ciggarettes daily.   ETOH: Very occasionally. 10 years ago 12-15 daily, 30 pack on weekend.   Marijuana: Denied  Illicit Drug Use: Denied, heroin, opioid, 2-3 years sober.     FH:  Colon Cancer: Denied  Any Cancer: Mother; Thyroid  Family Members with Pre-Cancerous Polyps: Denied  Crohn's: Denied  Ulcerative Colitis: Denied  Celiac Disease: Denied  Liver Disease: Father Hep C.     Meds: None  Daily NSAID Use: Denied  Daily Tylenol Use: Denied  Any New Meds: Flomax, Buspar, Methadone (November 2024).       Imaging: (1/27/25) Ultrasound of the right upper quadrant with liver Dopplers: Normal liver sonographic morphology.     Mildly dilated CBD without evidence of choledocholithiasis or intrahepatic biliary dilatation.    Endoscopy History: EGD: (None):     COLONOSCOPY:  "(None):     History obtained from: patient    Review of Systems       Objective   /82 (BP Location: Right arm, Patient Position: Sitting, Cuff Size: Standard)   Pulse 80   Temp 97.6 °F (36.4 °C) (Temporal)   Ht 5' 7.75\" (1.721 m)   Wt 71.7 kg (158 lb)   SpO2 99%   BMI 24.20 kg/m²      Physical Exam  Sclera without icterus and benign. Lung sounds CTA b/l. Normal S1 & S2 upon exam. Abdomen is soft, nondistended, nontender, with faint bowel sounds x 4.  No edema noted of the b/l lower extremities upon exam today. Skin is non-icteric.       "

## 2025-02-05 NOTE — PATIENT INSTRUCTIONS
Proceed with blood work and Hep C work up.   Start working on drinking at least 32-64 oz of water daily.   Start Miralax 1 capful daily, can adjust according to Bms.   Continue to watch for red flag symptoms.   Schedule a f/u OV in 3 months.     We did review GI red flag symptoms, including, but not limited to: chronic nausea, vomiting, diarrhea, chills, fever, and unintentional weight loss and should call or contact our office with any changes or concerns. I reviewed with the patient that if they notice any blood while vomiting or in their stool they should contact or office or go to the nearest emergency room for immediate evaluation. The patient was agreeable and verbalized an understanding.

## 2025-02-05 NOTE — ASSESSMENT & PLAN NOTE
While the patient was in the office today, I discussed with the patient that since they are positive for hepatitis C, we will proceed with further imaging and blood work to fully evaluate the overall health of the liver and to help establish whether or not they would qualify to proceed with hepatitis C treatment.  I advised the patient that once we have the results of the diagnostic studies and blood work, our office will contact them to review the results and discuss any other treatment plan recommendations.  The patient was agreeable and verbalized an understanding.    I also explained to the patient that we have an entire team dedicated to the hepatitis C treatment program and that I would be forwarding their information to that team today and that in the next few days someone should be in contact with them as to the next step and how the overall process works.  I explained that the team will take over from this point with regards to any other workup that is necessary as well as qualifying them as a candidate for hepatitis C treatment and any prior authorization/pharmacy needs moving forward.  The patient was agreeable and verbalized an understanding.     Encouraged the patient to continue to abstain from any illegal drugs or alcohol.  Orders:  •  Hepatic function panel; Future  •  Hepatitis A antibody, total; Future  •  Hepatitis B surface antibody; Future  •  US elastography/UGAP; Future  •  HCV FIBROSURE; Future  •  Hepatitis C antibody; Future  •  Hepatitis C genotype; Future

## 2025-02-05 NOTE — ASSESSMENT & PLAN NOTE
Orders:  •  Hepatic function panel; Future  •  Hepatitis A antibody, total; Future  •  Hepatitis B surface antibody; Future  •  US elastography/UGAP; Future  •  HCV FIBROSURE; Future  •  Hepatitis C antibody; Future  •  Hepatitis C genotype; Future

## 2025-02-08 ENCOUNTER — APPOINTMENT (OUTPATIENT)
Dept: LAB | Facility: CLINIC | Age: 45
End: 2025-02-08
Payer: COMMERCIAL

## 2025-02-08 ENCOUNTER — APPOINTMENT (OUTPATIENT)
Dept: LAB | Facility: HOSPITAL | Age: 45
End: 2025-02-08
Payer: COMMERCIAL

## 2025-02-08 DIAGNOSIS — Z87.19 HISTORY OF CIRRHOSIS OF LIVER: ICD-10-CM

## 2025-02-08 DIAGNOSIS — Z86.19 HISTORY OF HEPATITIS C: ICD-10-CM

## 2025-02-09 PROBLEM — K59.03 DRUG-INDUCED CONSTIPATION: Status: ACTIVE | Noted: 2025-02-09

## 2025-02-09 NOTE — ASSESSMENT & PLAN NOTE
While the patient and his mother in the office today, I discussed with the patient that with the chronic use of methadone to manage his opioid addiction it is very common to have drug-induced constipation and that for now one of the biggest things he can do is to work on drinking more water daily and I have encouraged him to try to drink at least 32 to 64 ounces of water daily.  I also would like him to start 1 capful of MiraLAX daily and can adjust up or down according to his bowel movements.  He is to contact our office if he has any side effects or issues with the changes in his medication regimen or he is still having issues with constipation.  The patient was agreeable and verbalized an understanding.    Continue with his methadone program as directed.  Orders:  •  polyethylene glycol (GLYCOLAX) 17 GM/SCOOP powder; Take 17 g by mouth daily

## 2025-02-11 ENCOUNTER — HOSPITAL ENCOUNTER (OUTPATIENT)
Dept: ULTRASOUND IMAGING | Facility: HOSPITAL | Age: 45
Discharge: HOME | End: 2025-02-11
Payer: COMMERCIAL

## 2025-02-11 DIAGNOSIS — Z87.19 HISTORY OF CIRRHOSIS OF LIVER: ICD-10-CM

## 2025-02-11 DIAGNOSIS — Z86.19 HISTORY OF HEPATITIS C: ICD-10-CM

## 2025-02-11 PROCEDURE — 76981 USE PARENCHYMA: CPT

## 2025-02-13 ENCOUNTER — TELEPHONE (OUTPATIENT)
Age: 45
End: 2025-02-13

## 2025-02-13 ENCOUNTER — RESULTS FOLLOW-UP (OUTPATIENT)
Age: 45
End: 2025-02-13

## 2025-02-13 DIAGNOSIS — B18.2 CHRONIC HEPATITIS C WITHOUT HEPATIC COMA (HCC): Primary | ICD-10-CM

## 2025-02-13 NOTE — RESULT ENCOUNTER NOTE
Can you please call the patient and let him know that I just looked at the results of his ultrasound elastography and there was a fibrosis or thickness score of F 0-1 which is absent or mild fibrosis, which is a good thing as well as a steatosis or fatty score of S1, which is absent to very mild steatosis or fatty liver score, also a good thing.  This test shows that overall his liver appears to be in very good health at this point in time.

## 2025-02-13 NOTE — TELEPHONE ENCOUNTER
Called and gave result to his mom (approved per Communication consent form) He was in the shower. They were very grateful for the good report.

## 2025-02-13 NOTE — TELEPHONE ENCOUNTER
----- Message from ZOYA Dhillon sent at 2/13/2025  4:09 PM EST -----  Can you please call the patient and let him know that I just looked at the results of his ultrasound elastography and there was a fibrosis or thickness score of F 0-1 which is absent or mild fibrosis, which is a good thing as well as a steatosis or fatty score of S1, which is absent to very mild steatosis or fatty liver score, also a good thing.  This test shows that overall his liver appears to be in very good health at this point in time.

## 2025-02-13 NOTE — TELEPHONE ENCOUNTER
Can we please call the patient and kindly remind him that there is blood work I would like him to get done as I did see that he had his ultrasound elastography done, but I do not have the results yet and as soon as I do we will contact him, but we also need the blood work as well.

## 2025-02-13 NOTE — TELEPHONE ENCOUNTER
Pt returned call for results of his elastography. This was reviewed with pt. Informed pt of ordered lab work. He notes he has attempted to have this collected at two different facilities however vein access was not obtained. He reports he will try again this weekend.

## 2025-02-15 ENCOUNTER — LAB (OUTPATIENT)
Dept: LAB | Facility: CLINIC | Age: 45
End: 2025-02-15

## 2025-02-15 DIAGNOSIS — B18.2 CHRONIC HEPATITIS C WITHOUT HEPATIC COMA (HCC): ICD-10-CM

## 2025-02-16 ENCOUNTER — APPOINTMENT (OUTPATIENT)
Dept: LAB | Facility: HOSPITAL | Age: 45
End: 2025-02-16
Payer: COMMERCIAL

## 2025-02-16 LAB
ALBUMIN SERPL BCG-MCNC: 4.1 G/DL (ref 3.5–5)
ALP SERPL-CCNC: 48 U/L (ref 34–104)
ALT SERPL W P-5'-P-CCNC: 44 U/L (ref 7–52)
AST SERPL W P-5'-P-CCNC: 35 U/L (ref 13–39)
BILIRUB DIRECT SERPL-MCNC: 0.15 MG/DL (ref 0–0.2)
BILIRUB SERPL-MCNC: 0.52 MG/DL (ref 0.2–1)
PROT SERPL-MCNC: 6.6 G/DL (ref 6.4–8.4)

## 2025-02-16 PROCEDURE — 86803 HEPATITIS C AB TEST: CPT

## 2025-02-16 PROCEDURE — 86708 HEPATITIS A ANTIBODY: CPT

## 2025-02-16 PROCEDURE — 86704 HEP B CORE ANTIBODY TOTAL: CPT

## 2025-02-16 PROCEDURE — 86706 HEP B SURFACE ANTIBODY: CPT

## 2025-02-16 PROCEDURE — 87902 NFCT AGT GNTYP ALYS HEP C: CPT

## 2025-02-16 PROCEDURE — 36415 COLL VENOUS BLD VENIPUNCTURE: CPT

## 2025-02-16 PROCEDURE — 80076 HEPATIC FUNCTION PANEL: CPT

## 2025-02-17 LAB
HAV AB SER QL IA: NORMAL
HBV CORE AB SER QL: NORMAL
HBV SURFACE AB SER-ACNC: 18.6 MIU/ML
HCV AB SER QL: REACTIVE

## 2025-02-19 LAB
A2 MACROGLOB SERPL-MCNC: 139 MG/DL (ref 110–276)
ALT SERPL W P-5'-P-CCNC: 57 IU/L (ref 0–55)
APO A-I SERPL-MCNC: 174 MG/DL (ref 101–178)
BILIRUB SERPL-MCNC: 0.4 MG/DL (ref 0–1.2)
COMMENT: ABNORMAL
FIBROSIS SCORING:: ABNORMAL
FIBROSIS STAGE SERPL QL: ABNORMAL
GGT SERPL-CCNC: 48 IU/L (ref 0–65)
HAPTOGLOB SERPL-MCNC: 97 MG/DL (ref 23–355)
INTERPRETATIONS: ABNORMAL
LIVER FIBR SCORE SERPL CALC.FIBROSURE: 0.09 (ref 0–0.21)
NECROINFLAMM ACTIVITY SCORING:: ABNORMAL
NECROINFLAMMATORY ACT GRADE SERPL QL: ABNORMAL
NECROINFLAMMATORY ACT SCORE SERPL: 0.27 (ref 0–0.17)
REF LAB TEST METHOD: ABNORMAL
SERVICE CMNT-IMP: ABNORMAL

## 2025-02-21 LAB — HCV GENTYP SERPL NAA+PROBE: NORMAL

## 2025-02-24 DIAGNOSIS — B18.2 CHRONIC HEPATITIS C WITHOUT HEPATIC COMA (HCC): Primary | ICD-10-CM

## 2025-02-24 NOTE — TELEPHONE ENCOUNTER
Mavyret 8 week treatment    Treatment  naive  Viral load [id number]  Genotype 1a  Elastography F0-F1    HBV surf ab  18.60  -IMMUNE

## 2025-02-24 NOTE — TELEPHONE ENCOUNTER
2/5/25 office visit  -hepatitis c tx     Reviewed hepatitis c pre treatment lab results. Discuss hepatitis c tx, medication tx options and approval process.

## 2025-03-05 ENCOUNTER — CONSULT (OUTPATIENT)
Dept: UROLOGY | Facility: CLINIC | Age: 45
End: 2025-03-05
Payer: COMMERCIAL

## 2025-03-05 VITALS
WEIGHT: 154 LBS | DIASTOLIC BLOOD PRESSURE: 88 MMHG | OXYGEN SATURATION: 99 % | HEIGHT: 68 IN | SYSTOLIC BLOOD PRESSURE: 130 MMHG | HEART RATE: 74 BPM | BODY MASS INDEX: 23.34 KG/M2 | TEMPERATURE: 97.6 F

## 2025-03-05 DIAGNOSIS — R35.1 NOCTURIA: Primary | ICD-10-CM

## 2025-03-05 LAB
POST-VOID RESIDUAL VOLUME, ML POC: 26 ML
SL AMB  POCT GLUCOSE, UA: NORMAL
SL AMB LEUKOCYTE ESTERASE,UA: NORMAL
SL AMB POCT BILIRUBIN,UA: NORMAL
SL AMB POCT BLOOD,UA: NORMAL
SL AMB POCT CLARITY,UA: CLEAR
SL AMB POCT COLOR,UA: YELLOW
SL AMB POCT KETONES,UA: NORMAL
SL AMB POCT NITRITE,UA: NORMAL
SL AMB POCT PH,UA: 6
SL AMB POCT SPECIFIC GRAVITY,UA: 1.02
SL AMB POCT URINE PROTEIN: NORMAL
SL AMB POCT UROBILINOGEN: 0.2

## 2025-03-05 PROCEDURE — 51798 US URINE CAPACITY MEASURE: CPT

## 2025-03-05 PROCEDURE — 81002 URINALYSIS NONAUTO W/O SCOPE: CPT

## 2025-03-05 PROCEDURE — 99203 OFFICE O/P NEW LOW 30 MIN: CPT

## 2025-03-05 NOTE — PATIENT INSTRUCTIONS
I would encourage you to call your insurance to find out estimated cost for Myrbetriq 25 mg daily for urinary frequency.

## 2025-03-05 NOTE — TELEPHONE ENCOUNTER
Spoke to [place], [facility]. Provided verbal order for Mavyret / confirmed patient information was received.

## 2025-03-05 NOTE — PROGRESS NOTES
Name: Nic Estrada      : 1980      MRN: [id number]  Encounter Provider: ZOYA Brennan  Encounter Date: 3/5/2025   Encounter department: [facility]    :  Assessment & Plan  Nocturia  More consistent with overactive bladder component and likely anxious bladder.  Nocturia may be worsening due to possible adverse effects of BuSpar as symptoms did seem to start around the same time he was started on BuSpar and methadone.  He does not feel like BuSpar is helping with his anxiety so he is willing to try reducing this at nighttime to see if this might reduce his nocturia.  I did encourage him to discuss with his PCP regarding alternative therapy.  We did discuss options to trial OAB medications and we reviewed potential for anticholinergics versus beta 3 agonist.  He is already having issues with constipation due to methadone and he is hesitant to want to start an anticholinergic.  Beta 3 agonist such as Myrbetriq may be cost prohibitive.  I did encourage him to contact his insurance to find out estimated cost for Myrbetriq 25 mg daily as he may benefit from this.  He will let me know if he wishes to trial any oral therapy.  Otherwise he can follow-up as needed.    Orders:    Ambulatory Referral to Urology    POCT urine dip    POCT Measure PVR        History of Present Illness     New patient to office with PMHx significant for hepatitis C, drug-induced constipation, anxiety, history of opioid abuse, methadone use, history of pancreatitis, history of cirrhosis of the liver.    Patient presents today for evaluation of nocturia. This has been ongoing since around 2024 when he moved here from [place]. He also feels his nocturia also increased since being started on Methadone and Buspar around the beginning of November of last year. He reports nocturia on average 3-4 times per night. He was started on Flomax by his PCP about 3 months ago but stopped taking it after  about 1 month. He stopped because of retrograde ejaculation. He thinks it might have help his urination some. He states he has been under a lot of stress lately and feels anxiety may be playing a role. He reports a good urinary stream and feels he is emptying well. PVR today is 26 mL. During the daytime he reports minimal frequency.     He was having issues with constipation and was started on Glycolax which seems to have helped his constipation. This was due to methadone.     Primarily drinks 1 coffee in the morning, about 64 oz of water throughout the day. He tries to stop drinking fluids around dinner time but might have something small in the evening. Rare soda, rare alcohol use.     Review of Systems   Constitutional:  Negative for chills and fever.   HENT:  Negative for congestion and sore throat.    Respiratory:  Negative for cough and shortness of breath.    Cardiovascular:  Negative for chest pain and leg swelling.   Gastrointestinal:  Negative for abdominal pain, constipation and diarrhea.   Genitourinary:  Negative for difficulty urinating, dysuria, frequency, hematuria and urgency.        Nocturia 3-4 times per night   Musculoskeletal:  Negative for back pain and gait problem.   Skin:  Negative for wound.   Allergic/Immunologic: Negative for immunocompromised state.   Hematological:  Does not bruise/bleed easily.     Objective   There were no vitals taken for this visit.    Physical Exam  Vitals and nursing note reviewed.   Constitutional:       General: He is not in acute distress.     Appearance: He is well-developed.   HENT:      Head: Normocephalic and atraumatic.   Eyes:      Conjunctiva/sclera: Conjunctivae normal.   Cardiovascular:      Rate and Rhythm: Normal rate and regular rhythm.      Heart sounds: No murmur heard.  Pulmonary:      Effort: Pulmonary effort is normal. No respiratory distress.      Breath sounds: Normal breath sounds.   Abdominal:      Palpations: Abdomen is soft.       Tenderness: There is no abdominal tenderness.   Musculoskeletal:         General: No swelling.      Cervical back: Neck supple.   Skin:     General: Skin is warm and dry.      Capillary Refill: Capillary refill takes less than 2 seconds.   Neurological:      Mental Status: He is alert.   Psychiatric:         Mood and Affect: Mood normal.           Imaging:          Please Note:  Voice dictation software has been used to create this document. There may be inadvertent transcriptions errors.     ZOYA Brennan 03/05/25

## 2025-03-06 NOTE — TELEPHONE ENCOUNTER
Pt called and returning the call on the message left yesterday so call was transferred to [facility] for assistance.

## 2025-03-06 NOTE — TELEPHONE ENCOUNTER
Patient received Mavyret delivery and will begin tx today. Education initiated -dose, missed dose, side effects and required blood work.     4 week labs due  4/3/25

## 2025-03-30 ENCOUNTER — APPOINTMENT (OUTPATIENT)
Dept: LAB | Facility: HOSPITAL | Age: 45
End: 2025-03-30
Payer: COMMERCIAL

## 2025-03-30 DIAGNOSIS — B18.2 CHRONIC HEPATITIS C WITHOUT HEPATIC COMA (HCC): ICD-10-CM

## 2025-03-30 LAB
ALBUMIN SERPL BCG-MCNC: 4.1 G/DL (ref 3.5–5)
ALP SERPL-CCNC: 49 U/L (ref 34–104)
ALT SERPL W P-5'-P-CCNC: 15 U/L (ref 7–52)
ANION GAP SERPL CALCULATED.3IONS-SCNC: 9 MMOL/L (ref 4–13)
AST SERPL W P-5'-P-CCNC: 20 U/L (ref 13–39)
BASOPHILS # BLD AUTO: 0.08 THOUSANDS/ÂΜL (ref 0–0.1)
BASOPHILS NFR BLD AUTO: 1 % (ref 0–1)
BILIRUB SERPL-MCNC: 0.6 MG/DL (ref 0.2–1)
BUN SERPL-MCNC: 19 MG/DL (ref 5–25)
CALCIUM SERPL-MCNC: 8.9 MG/DL (ref 8.4–10.2)
CHLORIDE SERPL-SCNC: 108 MMOL/L (ref 96–108)
CO2 SERPL-SCNC: 23 MMOL/L (ref 21–32)
CREAT SERPL-MCNC: 1.27 MG/DL (ref 0.6–1.3)
EOSINOPHIL # BLD AUTO: 0.9 THOUSAND/ÂΜL (ref 0–0.61)
EOSINOPHIL NFR BLD AUTO: 12 % (ref 0–6)
ERYTHROCYTE [DISTWIDTH] IN BLOOD BY AUTOMATED COUNT: 12 % (ref 11.6–15.1)
GFR SERPL CREATININE-BSD FRML MDRD: 69 ML/MIN/1.73SQ M
GLUCOSE SERPL-MCNC: 93 MG/DL (ref 65–140)
HCT VFR BLD AUTO: 40.8 % (ref 36.5–49.3)
HGB BLD-MCNC: 13.7 G/DL (ref 12–17)
IMM GRANULOCYTES # BLD AUTO: 0.02 THOUSAND/UL (ref 0–0.2)
IMM GRANULOCYTES NFR BLD AUTO: 0 % (ref 0–2)
LYMPHOCYTES # BLD AUTO: 2.78 THOUSANDS/ÂΜL (ref 0.6–4.47)
LYMPHOCYTES NFR BLD AUTO: 38 % (ref 14–44)
MCH RBC QN AUTO: 30.9 PG (ref 26.8–34.3)
MCHC RBC AUTO-ENTMCNC: 33.6 G/DL (ref 31.4–37.4)
MCV RBC AUTO: 92 FL (ref 82–98)
MONOCYTES # BLD AUTO: 0.51 THOUSAND/ÂΜL (ref 0.17–1.22)
MONOCYTES NFR BLD AUTO: 7 % (ref 4–12)
NEUTROPHILS # BLD AUTO: 3.06 THOUSANDS/ÂΜL (ref 1.85–7.62)
NEUTS SEG NFR BLD AUTO: 42 % (ref 43–75)
NRBC BLD AUTO-RTO: 0 /100 WBCS
PLATELET # BLD AUTO: 224 THOUSANDS/UL (ref 149–390)
PMV BLD AUTO: 9.6 FL (ref 8.9–12.7)
POTASSIUM SERPL-SCNC: 3.6 MMOL/L (ref 3.5–5.3)
PROT SERPL-MCNC: 6.7 G/DL (ref 6.4–8.4)
RBC # BLD AUTO: 4.44 MILLION/UL (ref 3.88–5.62)
SODIUM SERPL-SCNC: 140 MMOL/L (ref 135–147)
WBC # BLD AUTO: 7.35 THOUSAND/UL (ref 4.31–10.16)

## 2025-03-30 PROCEDURE — 87521 HEPATITIS C PROBE&RVRS TRNSC: CPT

## 2025-03-30 PROCEDURE — 80053 COMPREHEN METABOLIC PANEL: CPT

## 2025-03-30 PROCEDURE — 87522 HEPATITIS C REVRS TRNSCRPJ: CPT

## 2025-03-30 PROCEDURE — 36415 COLL VENOUS BLD VENIPUNCTURE: CPT

## 2025-03-30 PROCEDURE — 85025 COMPLETE CBC W/AUTO DIFF WBC: CPT

## 2025-03-31 LAB — HCV RNA SERPL NAA+PROBE-ACNC: NOT DETECTED K[IU]/ML

## 2025-04-12 ENCOUNTER — APPOINTMENT (OUTPATIENT)
Dept: LAB | Facility: HOSPITAL | Age: 45
End: 2025-04-12
Payer: COMMERCIAL

## 2025-04-12 DIAGNOSIS — R94.31 NONSPECIFIC ABNORMAL ELECTROCARDIOGRAM (ECG) (EKG): ICD-10-CM

## 2025-04-14 ENCOUNTER — OFFICE VISIT (OUTPATIENT)
Dept: GASTROENTEROLOGY | Facility: CLINIC | Age: 45
End: 2025-04-14
Payer: COMMERCIAL

## 2025-04-14 VITALS
SYSTOLIC BLOOD PRESSURE: 151 MMHG | HEIGHT: 68 IN | TEMPERATURE: 97.6 F | HEART RATE: 59 BPM | WEIGHT: 164.6 LBS | DIASTOLIC BLOOD PRESSURE: 84 MMHG | OXYGEN SATURATION: 99 % | BODY MASS INDEX: 24.95 KG/M2

## 2025-04-14 DIAGNOSIS — K59.03 DRUG-INDUCED CONSTIPATION: ICD-10-CM

## 2025-04-14 DIAGNOSIS — Z86.19 HISTORY OF HEPATITIS C: Primary | ICD-10-CM

## 2025-04-14 DIAGNOSIS — K21.9 GASTROESOPHAGEAL REFLUX DISEASE, UNSPECIFIED WHETHER ESOPHAGITIS PRESENT: ICD-10-CM

## 2025-04-14 DIAGNOSIS — Z72.0 TOBACCO USE: ICD-10-CM

## 2025-04-14 DIAGNOSIS — R13.10 PAINFUL SWALLOWING: ICD-10-CM

## 2025-04-14 DIAGNOSIS — F11.90 METHADONE USE: ICD-10-CM

## 2025-04-14 PROBLEM — Z87.19 HISTORY OF PANCREATITIS: Status: RESOLVED | Noted: 2024-11-04 | Resolved: 2025-04-14

## 2025-04-14 PROBLEM — Z87.19 HISTORY OF CIRRHOSIS OF LIVER: Status: RESOLVED | Noted: 2024-11-04 | Resolved: 2025-04-14

## 2025-04-14 PROCEDURE — 99214 OFFICE O/P EST MOD 30 MIN: CPT | Performed by: NURSE PRACTITIONER

## 2025-04-14 RX ORDER — FAMOTIDINE 40 MG/1
40 TABLET, FILM COATED ORAL 2 TIMES DAILY
Qty: 180 TABLET | Refills: 0 | Status: SHIPPED | OUTPATIENT
Start: 2025-04-14

## 2025-04-14 NOTE — PROGRESS NOTES
Name: Nic Estrada      : 1980      MRN: [id number]  Encounter Provider: ZOYA Dhillon  Encounter Date: 2025   Encounter department: [facility] [place]  :  Assessment & Plan  History of hepatitis C  Improved/Stable    Continue hepatitis C treatment as ordered by hep C team.  Continue to follow-up with any blood work or testing as ordered by hep C team.  Continue to abstain from alcohol or illicit drug use.       Gastroesophageal reflux disease, unspecified whether esophagitis present  While the patient was in the office today, I did have a thorough conversation with the patient and his mother and explained to him that I am confident there is underlying component of esophagitis and gastritis from his chronic smoking and vaping.  I also feel that there is a component of possibly eating too quickly and reminded him that he should follow dysphagia precautions, which includes eating 4-5 small meals daily, take small bites, cutting food into smaller pieces, chew thoroughly and take time while eating to prevent choking or aspiration.    In the meantime to try to reduce some of the GERD symptoms and discomfort we are going to start him on famotidine 40 mg twice daily for the next 3 months and see how he does.  At his next office visit we would be looking to decrease it down to 20 mg twice daily and then eventually as needed.  The patient was agreeable and verbalized an understanding.    Continue to try to avoid acidic or trigger foods is much as possible.  Orders:  •  famotidine (PEPCID) 40 MG tablet; Take 1 tablet (40 mg total) by mouth 2 (two) times a day    Painful swallowing  Orders:  •  famotidine (PEPCID) 40 MG tablet; Take 1 tablet (40 mg total) by mouth 2 (two) times a day    Tobacco use  Encouraged the patient to work on decreasing his daily vaping/smoking by at least 25%, slowly over time.  Orders:  •  famotidine (PEPCID) 40 MG tablet; Take 1 tablet (40 mg  total) by mouth 2 (two) times a day    Drug-induced constipation  Improved/Stable    Continue MiraLAX daily.  Continue to try to drink at least 32 to 64 ounces of water daily.  Continue to watch for red flag symptoms.    While the patient was in the office today we did review GI red flag symptoms, including, but not limited to: chronic nausea, vomiting, diarrhea, chills, fever, and unintentional weight loss and should call or contact our office with any changes or concerns. I reviewed with the patient that if they notice any blood while vomiting or in their stool they should contact or office or go to the nearest emergency room for immediate evaluation. The patient was agreeable and verbalized an understanding.       Methadone use  Continue to follow-up with methadone clinic/prescriber as directed/scheduled.       The patient is to schedule a follow up office visit in 3 months, but understands to call or contact our offices with any issues before then or as needed.     History of Present Illness   Nic Estrada is a 44 y.o. male who presents today for a follow-up office visit regarding his history of hepatitis C, history of cirrhosis of the liver, GERD symptoms, painful swallowing, tobacco use, drug-induced constipation secondary to methadone use for recovery.  The patient and his mother report that since his last office visit he does feel there has been overall improvement in his symptoms as he has 2-1/2 weeks left of his hepatitis C treatment and his most recent blood work showed that he has 0 viral load.  The patient reports that his constipation has definitely improved since he has been trying to be more diligent about drinking more water and since he started the MiraLAX every day.    However, he does report that regularly he noticed that when he eats the first few bites of food, it seems as though that it is difficult to swallow and sometimes painful and then it seems to get better.  However, he does  "report that he does feel that sometimes he probably eats a little too fast and that could be part of the issue as well.    The patient currently denies any reflux, nausea, dysphagia, vomiting, decreased appetite, unplanned weight loss, or abdominal pain. Water Intake: 24-36 oz per day.     The patient currently reports that they have a BM every 1-2 days and reports that it is relieving, without any consistent diarrhea, constipation, straining, melena or bloody stools. Last BM: This morning. Flatus: Yes.    GI Meds: MiraLAX daily, Glecaprevir-Pibrontasvir 100mg/40 mg TID.   Daily NSAID Use: Denied    Imaging: (None):     Endoscopy History: EGD: (None):      COLONOSCOPY: (None):    HPI  History obtained from: patient and patient's parent  Review of Systems A complete review of systems is negative other than that noted above in the HPI.      Current Outpatient Medications   Medication Sig Dispense Refill   • busPIRone (BUSPAR) 15 mg tablet Take 1 tablet (15 mg total) by mouth 2 (two) times a day 60 tablet 4   • famotidine (PEPCID) 40 MG tablet Take 1 tablet (40 mg total) by mouth 2 (two) times a day 180 tablet 0   • Glecaprevir-Pibrentasvir 100-40 MG TABS Take 3 tablets by mouth in the morning Take (3) tablets by mouth with food once daily 84 tablet 1   • methadone (DOLOPHINE) 10 MG/5ML solution Take 105 mg by mouth in the morning     • naloxone (NARCAN) 4 mg/0.1 mL nasal spray Use as directed     • polyethylene glycol (GLYCOLAX) 17 GM/SCOOP powder Take 17 g by mouth daily 510 g 3   • tamsulosin (FLOMAX) 0.4 mg Take 1 capsule (0.4 mg total) by mouth daily with dinner (Patient not taking: Reported on 3/5/2025) 30 capsule 2     No current facility-administered medications for this visit.     Objective   /84 (BP Location: Left arm, Patient Position: Sitting, Cuff Size: Standard)   Pulse 59   Temp 97.6 °F (36.4 °C) (Temporal)   Ht 5' 7.7\" (1.72 m)   Wt 74.7 kg (164 lb 9.6 oz)   SpO2 99%   BMI 25.25 kg/m² "     Physical Exam   Abdomen is soft, nondistended, nontender, with hypoactive bowel sounds x 4.  No edema noted of the b/l lower extremities upon exam today. Skin is non-icteric.     Lab Results: I personally reviewed relevant lab results.

## 2025-04-14 NOTE — ASSESSMENT & PLAN NOTE
Encouraged the patient to work on decreasing his daily vaping/smoking by at least 25%, slowly over time.  Orders:  •  famotidine (PEPCID) 40 MG tablet; Take 1 tablet (40 mg total) by mouth 2 (two) times a day

## 2025-04-14 NOTE — ASSESSMENT & PLAN NOTE
Improved/Stable    Continue hepatitis C treatment as ordered by hep C team.  Continue to follow-up with any blood work or testing as ordered by hep C team.  Continue to abstain from alcohol or illicit drug use.

## 2025-04-14 NOTE — PROGRESS NOTES
"Name: Nic Estrada      : 1980      MRN: [id number]  Encounter Provider: ZOYA Dhillon  Encounter Date: 2025   Encounter department: [facility] [place]  :  Assessment & Plan  History of hepatitis C         History of cirrhosis of liver         Drug-induced constipation         Methadone use             History of Present Illness {?Quick Links Encounters * My Last Note * Last Note in Specialty * Snapshot * Since Last Visit * History :67460}  Nic Estrada is a 44 y.o. male who presents ***  HPI  {History obtained from(Optional):74861}  Review of Systems A complete review of systems is negative other than that noted above in the HPI.    {Select to Display PMH (Optional):71714}  Current Outpatient Medications   Medication Sig Dispense Refill   • busPIRone (BUSPAR) 15 mg tablet Take 1 tablet (15 mg total) by mouth 2 (two) times a day 60 tablet 4   • Glecaprevir-Pibrentasvir 100-40 MG TABS Take 3 tablets by mouth in the morning Take (3) tablets by mouth with food once daily 84 tablet 1   • methadone (DOLOPHINE) 10 MG/5ML solution Take 105 mg by mouth in the morning     • naloxone (NARCAN) 4 mg/0.1 mL nasal spray Use as directed     • polyethylene glycol (GLYCOLAX) 17 GM/SCOOP powder Take 17 g by mouth daily 510 g 3   • tamsulosin (FLOMAX) 0.4 mg Take 1 capsule (0.4 mg total) by mouth daily with dinner (Patient not taking: Reported on 3/5/2025) 30 capsule 2     No current facility-administered medications for this visit.     Objective {?Quick Links Trend Vitals * Enter New Vitals * Results Review * Timeline (Adult) * Labs * Imaging * Cardiology * Procedures * Lung Cancer Screening * Surgical eConsent :25424}  /84 (BP Location: Left arm, Patient Position: Sitting, Cuff Size: Standard)   Pulse 59   Temp 97.6 °F (36.4 °C) (Temporal)   Ht 5' 7.7\" (1.72 m)   Wt 74.7 kg (164 lb 9.6 oz)   SpO2 99%   BMI 25.25 kg/m²     Physical Exam ***      Lab Results: I " personally reviewed relevant lab results. {Lab Results Review (Optional):16693}    {Radiology Results Review (Optional):30988}      {Administrative / Billing Section (Optional):46311}

## 2025-04-14 NOTE — PATIENT INSTRUCTIONS
Finish out Hep C treatment as discussed with Hep C team.   Follow up with any blood work as recommended by the Hep C team.   Start Famotidine 40 mg, 1 pill, 2 x daily.   Work on decreasing daily vaping.   With regards to the dysphagia symptoms, I encouraged the patient to eat 4-5 small meals daily, take small bites, cut their food into small pieces, chew thoroughly, and take their time while eating to prevent any choking or aspiration. The patient verbalized an understanding.   Continue to try to drink at least 32-64 oz of water daily.   Continue to watch for red flag symptoms.   Schedule a f/u OV in 3 months.     We did review GI red flag symptoms, including, but not limited to: chronic nausea, vomiting, diarrhea, chills, fever, and unintentional weight loss and should call or contact our office with any changes or concerns. I reviewed with the patient that if they notice any blood while vomiting or in their stool they should contact or office or go to the nearest emergency room for immediate evaluation. The patient was agreeable and verbalized an understanding.

## 2025-04-14 NOTE — ASSESSMENT & PLAN NOTE
Improved/Stable    Continue MiraLAX daily.  Continue to try to drink at least 32 to 64 ounces of water daily.  Continue to watch for red flag symptoms.    While the patient was in the office today we did review GI red flag symptoms, including, but not limited to: chronic nausea, vomiting, diarrhea, chills, fever, and unintentional weight loss and should call or contact our office with any changes or concerns. I reviewed with the patient that if they notice any blood while vomiting or in their stool they should contact or office or go to the nearest emergency room for immediate evaluation. The patient was agreeable and verbalized an understanding.

## 2025-04-14 NOTE — ASSESSMENT & PLAN NOTE
While the patient was in the office today, I did have a thorough conversation with the patient and his mother and explained to him that I am confident there is underlying component of esophagitis and gastritis from his chronic smoking and vaping.  I also feel that there is a component of possibly eating too quickly and reminded him that he should follow dysphagia precautions, which includes eating 4-5 small meals daily, take small bites, cutting food into smaller pieces, chew thoroughly and take time while eating to prevent choking or aspiration.    In the meantime to try to reduce some of the GERD symptoms and discomfort we are going to start him on famotidine 40 mg twice daily for the next 3 months and see how he does.  At his next office visit we would be looking to decrease it down to 20 mg twice daily and then eventually as needed.  The patient was agreeable and verbalized an understanding.    Continue to try to avoid acidic or trigger foods is much as possible.  Orders:  •  famotidine (PEPCID) 40 MG tablet; Take 1 tablet (40 mg total) by mouth 2 (two) times a day

## 2025-04-15 LAB
ATRIAL RATE: 60 BPM
P AXIS: 6 DEGREES
PR INTERVAL: 142 MS
QRS AXIS: 30 DEGREES
QRSD INTERVAL: 84 MS
QT INTERVAL: 446 MS
QTC INTERVAL: 446 MS
T WAVE AXIS: 27 DEGREES
VENTRICULAR RATE: 60 BPM

## 2025-04-15 PROCEDURE — 93010 ELECTROCARDIOGRAM REPORT: CPT | Performed by: INTERNAL MEDICINE

## 2025-06-23 ENCOUNTER — DOCUMENTATION (OUTPATIENT)
Dept: ADMINISTRATIVE | Facility: OTHER | Age: 45
End: 2025-06-23

## 2025-06-23 NOTE — PROGRESS NOTES
06/23/25 3:49 PM     DM A1c outreach is not required, has active A1c order in chart.    Thank you.  Marleny Beebe MA  [facility]

## 2025-07-03 ENCOUNTER — VBI (OUTPATIENT)
Dept: ADMINISTRATIVE | Facility: OTHER | Age: 45
End: 2025-07-03

## 2025-07-03 NOTE — TELEPHONE ENCOUNTER
07/03/25 11:18 AM     Chart reviewed for Diabetic Eye Exam ; nothing is submitted to the patient's insurance at this time.     Natalie Alvarenga   [facility]

## 2025-07-14 ENCOUNTER — APPOINTMENT (OUTPATIENT)
Dept: LAB | Facility: HOSPITAL | Age: 45
End: 2025-07-14
Payer: COMMERCIAL

## 2025-07-14 DIAGNOSIS — B18.2 CHRONIC HEPATITIS C WITHOUT HEPATIC COMA (HCC): ICD-10-CM

## 2025-07-14 PROCEDURE — 87522 HEPATITIS C REVRS TRNSCRPJ: CPT

## 2025-07-14 PROCEDURE — 36415 COLL VENOUS BLD VENIPUNCTURE: CPT

## 2025-07-16 LAB — HCV RNA SERPL NAA+PROBE-ACNC: NOT DETECTED K[IU]/ML

## 2025-07-21 ENCOUNTER — OFFICE VISIT (OUTPATIENT)
Dept: GASTROENTEROLOGY | Facility: CLINIC | Age: 45
End: 2025-07-21
Payer: COMMERCIAL

## 2025-07-21 VITALS
SYSTOLIC BLOOD PRESSURE: 126 MMHG | HEART RATE: 64 BPM | DIASTOLIC BLOOD PRESSURE: 73 MMHG | HEIGHT: 68 IN | WEIGHT: 161.4 LBS | TEMPERATURE: 98.2 F | RESPIRATION RATE: 16 BRPM | BODY MASS INDEX: 24.46 KG/M2 | OXYGEN SATURATION: 99 %

## 2025-07-21 DIAGNOSIS — Z86.19 HISTORY OF HEPATITIS C: ICD-10-CM

## 2025-07-21 DIAGNOSIS — Z72.0 TOBACCO USE: ICD-10-CM

## 2025-07-21 DIAGNOSIS — R13.10 PAINFUL SWALLOWING: ICD-10-CM

## 2025-07-21 DIAGNOSIS — F11.90 METHADONE USE: ICD-10-CM

## 2025-07-21 DIAGNOSIS — R13.14 PHARYNGOESOPHAGEAL DYSPHAGIA: ICD-10-CM

## 2025-07-21 DIAGNOSIS — K21.9 GASTROESOPHAGEAL REFLUX DISEASE, UNSPECIFIED WHETHER ESOPHAGITIS PRESENT: Primary | ICD-10-CM

## 2025-07-21 DIAGNOSIS — K59.03 DRUG-INDUCED CONSTIPATION: ICD-10-CM

## 2025-07-21 PROCEDURE — 99214 OFFICE O/P EST MOD 30 MIN: CPT | Performed by: NURSE PRACTITIONER

## 2025-07-21 RX ORDER — FAMOTIDINE 40 MG/1
40 TABLET, FILM COATED ORAL 2 TIMES DAILY
Qty: 180 TABLET | Refills: 1 | Status: SHIPPED | OUTPATIENT
Start: 2025-07-21

## 2025-07-21 RX ORDER — FAMOTIDINE 40 MG/1
40 TABLET, FILM COATED ORAL 2 TIMES DAILY
Qty: 180 TABLET | Refills: 1 | Status: SHIPPED | OUTPATIENT
Start: 2025-07-21 | End: 2025-07-21 | Stop reason: SDUPTHER

## 2025-07-21 RX ORDER — POLYETHYLENE GLYCOL 3350 17 G/17G
17 POWDER, FOR SOLUTION ORAL DAILY
Qty: 510 G | Refills: 5 | Status: SHIPPED | OUTPATIENT
Start: 2025-07-21

## 2025-07-21 NOTE — ASSESSMENT & PLAN NOTE
Improving    Continue to work on being more diligent about taking famotidine 40 mg, 1 p.o. twice daily.  Continue to avoid acidic or trigger foods is much as possible.  If no improvement in your symptoms between now and your next office visit or symptoms should worsen, we will proceed with an EGD in the future.  The patient was agreeable and verbalized an understanding.  Orders:  •  famotidine (PEPCID) 40 MG tablet; Take 1 tablet (40 mg total) by mouth 2 (two) times a day

## 2025-07-21 NOTE — PATIENT INSTRUCTIONS
Continue to take Famotidine 40 mg, 1 pill, 2 x daily.   Continue to avoid acidic or trigger foods as much as possible.   Continue to work on slowly cutting down daily smoking.   With regards to the dysphagia symptoms, I encouraged the patient to eat 4-5 small meals daily, take small bites, cut their food into small pieces, chew thoroughly, and take their time while eating to prevent any choking or aspiration. The patient verbalized an understanding.   Hold off on EGD for now, but if symptoms get worse, you call us, or if they don't get better at next OV we will re-discuss EGD.   Continue Miralax daily, can titrate up/down down according to consistency/frequency.   Continue to try to drink at least 64 oz of water daily.   Continue to watch for red flag symptoms.   Schedule a f/u OV in 6 months.     We did review GI red flag symptoms, including, but not limited to: chronic nausea, vomiting, diarrhea, chills, fever, and unintentional weight loss and should call or contact our office with any changes or concerns. I reviewed with the patient that if they notice any blood while vomiting or in their stool they should contact or office or go to the nearest emergency room for immediate evaluation. The patient was agreeable and verbalized an understanding.

## 2025-07-21 NOTE — ASSESSMENT & PLAN NOTE
With regards to the dysphagia symptoms, I encouraged the patient to eat 4-5 small meals daily, take small bites, cut their food into small pieces, chew thoroughly, and take their time while eating to prevent any choking or aspiration. The patient verbalized an understanding.     Orders:  •  famotidine (PEPCID) 40 MG tablet; Take 1 tablet (40 mg total) by mouth 2 (two) times a day

## 2025-07-21 NOTE — PROGRESS NOTES
Name: Nic Estrada      : 1980      MRN: [id number]  Encounter Provider: ZOYA Dhillon  Encounter Date: 2025   Encounter department: [facility] [place]  :  Assessment & Plan  Gastroesophageal reflux disease, unspecified whether esophagitis present  Improving    Continue to work on being more diligent about taking famotidine 40 mg, 1 p.o. twice daily.  Continue to avoid acidic or trigger foods is much as possible.  If no improvement in your symptoms between now and your next office visit or symptoms should worsen, we will proceed with an EGD in the future.  The patient was agreeable and verbalized an understanding.  Orders:  •  famotidine (PEPCID) 40 MG tablet; Take 1 tablet (40 mg total) by mouth 2 (two) times a day    Pharyngoesophageal dysphagia  With regards to the dysphagia symptoms, I encouraged the patient to eat 4-5 small meals daily, take small bites, cut their food into small pieces, chew thoroughly, and take their time while eating to prevent any choking or aspiration. The patient verbalized an understanding.     Orders:  •  famotidine (PEPCID) 40 MG tablet; Take 1 tablet (40 mg total) by mouth 2 (two) times a day    Painful swallowing  Orders:  •  famotidine (PEPCID) 40 MG tablet; Take 1 tablet (40 mg total) by mouth 2 (two) times a day    Tobacco use  Encouraged the patient to continue to work on slowly and steadily titrating down on his daily cigarette use, with the eventual goal of complete cessation in time.    Orders:  •  famotidine (PEPCID) 40 MG tablet; Take 1 tablet (40 mg total) by mouth 2 (two) times a day    Drug-induced constipation  Improving/Stable    Continue MiraLAX daily.  Continue to drink at least 32 to 64 ounces of water daily.  Orders:  •  polyethylene glycol (GLYCOLAX) 17 GM/SCOOP powder; Take 17 g by mouth daily    Methadone use  Orders:  •  polyethylene glycol (GLYCOLAX) 17 GM/SCOOP powder; Take 17 g by mouth daily    History of  hepatitis C  Improved/Resolved       The patient is to schedule a follow up office visit in 6 months, but understands to call or contact our offices with any issues before then or as needed.     History of Present Illness   Nic Estrada is a 44 y.o. male who presents today for a follow-up office visit regarding his chronic GERD symptoms, dysphagia, painful swallowing, chronic daily tobacco use, drug-induced constipation, methadone use, and history of hepatitis C.  The patient has recently completed a full treatment program for his hepatitis C utilizing Glecaprevir-Pibrentasvie 100-40 mg 3 times daily as instructed.  The patient's most recent viral load blood work is negative, confirming successful treatment.    The patient reports that since his last office visit he did start taking the famotidine, however, he reports that he is not always as diligent as he should be about taking it twice a day and is still having some occasional swallowing issues and painful swallowing, but reports that it is better since starting the famotidine and that he really wants to concentrate on taking it twice a day consistently as it is still his goal to try to hold off on the EGD.  He reports he is also actively working on smoking cessation and has cut his overall amount of smoking daily down by at least 50% and plans on continuing to slowly titrate down with the eventual goal of complete cessation in the future.    He does report that he has also seen improvement in his chronic constipation and bowel movements since he started taking the MiraLAX daily and being more diligent about drinking more water.    The patient currently denies any nausea, vomiting, decreased appetite, unplanned weight loss, or abdominal pain. Water Intake: Adequate amounts per day.    The patient currently reports that they have a BM every 1 to 2 days and reports that it is usually relieving, without any consistent diarrhea, constipation, straining, melena  "or bloody stools.     GI Meds: 40 mg twice daily and MiraLAX daily.    Imaging: (None):     Endoscopy History: EGD: (None):      COLONOSCOPY: (None):    HPI  History obtained from: patient and patient's parent  Review of Systems A complete review of systems is negative other than that noted above in the HPI.      Current Medications[1]  Objective   /73 (BP Location: Left arm, Patient Position: Sitting, Cuff Size: Large)   Pulse 64   Temp 98.2 °F (36.8 °C) (Temporal)   Resp 16   Ht 5' 7.7\" (1.72 m)   Wt 73.2 kg (161 lb 6.4 oz)   SpO2 99%   BMI 24.76 kg/m²     Physical Exam   Abdomen nondistended, nontender, with hypoactive bowel sounds x 4.  No edema noted of the b/l lower extremities upon exam today. Skin is non-icteric.     Lab Results: I personally reviewed relevant lab results.                    [1]  Current Outpatient Medications   Medication Sig Dispense Refill   • busPIRone (BUSPAR) 15 mg tablet Take 1 tablet (15 mg total) by mouth 2 (two) times a day 60 tablet 4   • famotidine (PEPCID) 40 MG tablet Take 1 tablet (40 mg total) by mouth 2 (two) times a day 180 tablet 1   • methadone (DOLOPHINE) 10 MG/5ML solution Take 105 mg by mouth in the morning     • naloxone (NARCAN) 4 mg/0.1 mL nasal spray Use as directed     • polyethylene glycol (GLYCOLAX) 17 GM/SCOOP powder Take 17 g by mouth daily 510 g 5   • tamsulosin (FLOMAX) 0.4 mg Take 1 capsule (0.4 mg total) by mouth daily with dinner (Patient not taking: Reported on 3/5/2025) 30 capsule 2     No current facility-administered medications for this visit.     "

## 2025-07-21 NOTE — ASSESSMENT & PLAN NOTE
Encouraged the patient to continue to work on slowly and steadily titrating down on his daily cigarette use, with the eventual goal of complete cessation in time.    Orders:  •  famotidine (PEPCID) 40 MG tablet; Take 1 tablet (40 mg total) by mouth 2 (two) times a day

## 2025-07-21 NOTE — ASSESSMENT & PLAN NOTE
Improving/Stable    Continue MiraLAX daily.  Continue to drink at least 32 to 64 ounces of water daily.  Orders:  •  polyethylene glycol (GLYCOLAX) 17 GM/SCOOP powder; Take 17 g by mouth daily

## 2025-07-28 ENCOUNTER — TELEPHONE (OUTPATIENT)
Age: 45
End: 2025-07-28